# Patient Record
Sex: MALE | Race: WHITE
[De-identification: names, ages, dates, MRNs, and addresses within clinical notes are randomized per-mention and may not be internally consistent; named-entity substitution may affect disease eponyms.]

---

## 2017-03-24 ENCOUNTER — HOSPITAL ENCOUNTER (OUTPATIENT)
Dept: HOSPITAL 53 - M LABDRAW1 | Age: 62
End: 2017-03-24
Attending: FAMILY MEDICINE
Payer: COMMERCIAL

## 2017-03-24 DIAGNOSIS — R73.01: Primary | ICD-10-CM

## 2017-03-24 DIAGNOSIS — E78.2: ICD-10-CM

## 2017-03-24 LAB
ALBUMIN SERPL BCG-MCNC: 4 GM/DL (ref 3.2–5.2)
ALBUMIN/GLOB SERPL: 1.21 {RATIO} (ref 1–1.93)
ALP SERPL-CCNC: 75 U/L (ref 45–117)
ALT SERPL W P-5'-P-CCNC: 46 U/L (ref 12–78)
ANION GAP SERPL CALC-SCNC: 9 MEQ/L (ref 8–16)
AST SERPL-CCNC: 32 U/L (ref 15–37)
BILIRUB SERPL-MCNC: 1 MG/DL (ref 0.2–1)
BUN SERPL-MCNC: 19 MG/DL (ref 7–18)
CALCIUM SERPL-MCNC: 8.6 MG/DL (ref 8.8–10.2)
CHLORIDE SERPL-SCNC: 106 MEQ/L (ref 98–107)
CHOLEST SERPL-MCNC: 175 MG/DL (ref ?–200)
CO2 SERPL-SCNC: 27 MEQ/L (ref 21–32)
CREAT SERPL-MCNC: 0.92 MG/DL (ref 0.7–1.3)
EST. AVERAGE GLUCOSE BLD GHB EST-MCNC: 117 MG/DL (ref 60–110)
GFR SERPL CREATININE-BSD FRML MDRD: > 60 ML/MIN/{1.73_M2} (ref 49–?)
GLUCOSE SERPL-MCNC: 111 MG/DL (ref 80–110)
POTASSIUM SERPL-SCNC: 3.6 MEQ/L (ref 3.5–5.1)
PROT SERPL-MCNC: 7.3 GM/DL (ref 6.4–8.2)
SODIUM SERPL-SCNC: 142 MEQ/L (ref 136–145)
TRIGL SERPL-MCNC: 90 MG/DL (ref ?–150)

## 2017-07-07 ENCOUNTER — HOSPITAL ENCOUNTER (OUTPATIENT)
Dept: HOSPITAL 53 - M ED | Age: 62
Setting detail: OBSERVATION
LOS: 1 days | Discharge: HOME | End: 2017-07-08
Attending: FAMILY MEDICINE | Admitting: HOSPITALIST
Payer: COMMERCIAL

## 2017-07-07 VITALS — BODY MASS INDEX: 28.47 KG/M2 | HEIGHT: 68 IN | WEIGHT: 187.83 LBS

## 2017-07-07 VITALS — DIASTOLIC BLOOD PRESSURE: 81 MMHG | SYSTOLIC BLOOD PRESSURE: 161 MMHG

## 2017-07-07 DIAGNOSIS — E78.5: ICD-10-CM
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
## 2017-07-07 DIAGNOSIS — S32.029A: ICD-10-CM
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
## 2017-07-07 DIAGNOSIS — S32.018A: Primary | ICD-10-CM

## 2017-07-07 DIAGNOSIS — I10: ICD-10-CM
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
## 2017-07-07 DIAGNOSIS — E55.9: ICD-10-CM

## 2017-07-07 DIAGNOSIS — R73.03: ICD-10-CM
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
## 2017-07-07 DIAGNOSIS — Z79.899: ICD-10-CM

## 2017-07-07 DIAGNOSIS — R93.7: ICD-10-CM

## 2017-07-07 DIAGNOSIS — S32.039A: ICD-10-CM

## 2017-07-07 LAB
ANION GAP SERPL CALC-SCNC: 12 MEQ/L (ref 8–16)
BASOPHILS # BLD AUTO: 0 K/MM3 (ref 0–0.2)
BASOPHILS NFR BLD AUTO: 0.6 % (ref 0–1)
BUN SERPL-MCNC: 20 MG/DL (ref 7–18)
CALCIUM SERPL-MCNC: 9.2 MG/DL (ref 8.8–10.2)
CHLORIDE SERPL-SCNC: 107 MEQ/L (ref 98–107)
CO2 SERPL-SCNC: 23 MEQ/L (ref 21–32)
CREAT SERPL-MCNC: 1.03 MG/DL (ref 0.7–1.3)
EOSINOPHIL # BLD AUTO: 0.1 K/MM3 (ref 0–0.5)
EOSINOPHIL NFR BLD AUTO: 0.9 % (ref 0–3)
ERYTHROCYTE [DISTWIDTH] IN BLOOD BY AUTOMATED COUNT: 12.6 % (ref 11.5–14.5)
GFR SERPL CREATININE-BSD FRML MDRD: > 60 ML/MIN/{1.73_M2} (ref 49–?)
GLUCOSE SERPL-MCNC: 141 MG/DL (ref 80–110)
INR PPP: 1.11
LARGE UNSTAINED CELL #: 0.1 K/MM3 (ref 0–0.4)
LARGE UNSTAINED CELL %: 1.2 % (ref 0–4)
LYMPHOCYTES # BLD AUTO: 1.4 K/MM3 (ref 1.5–4.5)
LYMPHOCYTES NFR BLD AUTO: 14.4 % (ref 24–44)
MCH RBC QN AUTO: 31.2 PG (ref 27–33)
MCHC RBC AUTO-ENTMCNC: 35.5 G/DL (ref 32–36.5)
MCV RBC AUTO: 87.9 FL (ref 80–96)
MONOCYTES # BLD AUTO: 0.6 K/MM3 (ref 0–0.8)
MONOCYTES NFR BLD AUTO: 5.9 % (ref 0–5)
NEUTROPHILS # BLD AUTO: 7.4 K/MM3 (ref 1.8–7.7)
NEUTROPHILS NFR BLD AUTO: 77 % (ref 36–66)
PLATELET # BLD AUTO: 198 K/MM3 (ref 150–450)
POTASSIUM SERPL-SCNC: 3.6 MEQ/L (ref 3.5–5.1)
SODIUM SERPL-SCNC: 142 MEQ/L (ref 136–145)
WBC # BLD AUTO: 9.6 K/MM3 (ref 4–10)

## 2017-07-07 PROCEDURE — 93005 ELECTROCARDIOGRAM TRACING: CPT

## 2017-07-07 PROCEDURE — 85610 PROTHROMBIN TIME: CPT

## 2017-07-07 PROCEDURE — 86900 BLOOD TYPING SEROLOGIC ABO: CPT

## 2017-07-07 PROCEDURE — 71260 CT THORAX DX C+: CPT

## 2017-07-07 PROCEDURE — 85730 THROMBOPLASTIN TIME PARTIAL: CPT

## 2017-07-07 PROCEDURE — 85025 COMPLETE CBC W/AUTO DIFF WBC: CPT

## 2017-07-07 PROCEDURE — 96361 HYDRATE IV INFUSION ADD-ON: CPT

## 2017-07-07 PROCEDURE — 99284 EMERGENCY DEPT VISIT MOD MDM: CPT

## 2017-07-07 PROCEDURE — 96374 THER/PROPH/DIAG INJ IV PUSH: CPT

## 2017-07-07 PROCEDURE — 85027 COMPLETE CBC AUTOMATED: CPT

## 2017-07-07 PROCEDURE — 86901 BLOOD TYPING SEROLOGIC RH(D): CPT

## 2017-07-07 PROCEDURE — 74177 CT ABD & PELVIS W/CONTRAST: CPT

## 2017-07-07 PROCEDURE — 80048 BASIC METABOLIC PNL TOTAL CA: CPT

## 2017-07-07 PROCEDURE — 97162 PT EVAL MOD COMPLEX 30 MIN: CPT

## 2017-07-07 PROCEDURE — 81001 URINALYSIS AUTO W/SCOPE: CPT

## 2017-07-07 PROCEDURE — 96375 TX/PRO/DX INJ NEW DRUG ADDON: CPT

## 2017-07-07 PROCEDURE — 96376 TX/PRO/DX INJ SAME DRUG ADON: CPT

## 2017-07-07 PROCEDURE — 86850 RBC ANTIBODY SCREEN: CPT

## 2017-07-07 PROCEDURE — 96372 THER/PROPH/DIAG INJ SC/IM: CPT

## 2017-07-07 PROCEDURE — 36415 COLL VENOUS BLD VENIPUNCTURE: CPT

## 2017-07-07 RX ADMIN — MORPHINE SULFATE PRN MG: 4 INJECTION INTRAVENOUS at 14:23

## 2017-07-07 RX ADMIN — DOCUSATE SODIUM,SENNOSIDES SCH TAB: 50; 8.6 TABLET, FILM COATED ORAL at 21:01

## 2017-07-07 RX ADMIN — CYCLOBENZAPRINE HYDROCHLORIDE PRN MG: 5 TABLET, FILM COATED ORAL at 21:01

## 2017-07-07 RX ADMIN — MORPHINE SULFATE PRN MG: 4 INJECTION INTRAVENOUS at 13:35

## 2017-07-07 NOTE — REP
Clinical:  Trauma.

 

Technique:  Axial contrast enhanced images from the thoracic inlet to the upper

abdomen using 100 ml Isovue 370 intravenous contrast material with coronal and

sagittal re-formations.

 

Comparison:  03/08/2007.

 

Findings:

Lung fields demonstrate moderate chronic bibasilar changes along with mild

bronchiectasis and minimal scattered areas of pleural thickening which remain

relatively stable compared to 2007.  A 3 mm soft tissue nodule in the subpleural

right lower lobe (image 59) is unchanged.  No acute consolidation/contusion,

pleural effusion or pneumothorax. No significant nodule or mass lesion.

Mediastinum is without evidence for trauma/injury and mild prominence to right

hilar lymph nodes along with few scattered mediastinal lymph nodes remain stable

compared to 2007.  Musculoskeletal structures demonstrate age-related

degenerative changes, and a very subtle nondisplaced posterior left ninth rib

fracture cannot be excluded.

 

Impression:

Chronic stable changes as described above.

No acute mediastinal or pleuroparenchymal process.

Cannot exclude extremely subtle nondisplaced posterior left ninth rib fracture.

 

 

 

Signed by

Robert Adame MD 07/07/2017 02:57 P

## 2017-07-07 NOTE — HPEPDOC
Medical History and Physical


Date of Admission


07/07/2017





History and Physical


Primary care provider: Dr. Ware





Attending: Dr. Alexia Pratt





CHIEF COMPLAINT: 


Intractable back pain status post fall from a height of approximately 5 feet





HISTORY OF PRESENT ILLNESS:


Mr. Parikh is a 61-year-old  male who was standing on some 

scaffolding that was approximately 5 feet off the ground while painting his 

house.  This occurred earlier today.  He believes that one of the boards may 

have broken or flipped and he fell to the ground landing on his back on a 2 x 4 

that was laying on the concrete.  He did not lose consciousness, and does not 

believe that he struck his head. His wife did witness the fall as well and she 

is accompanying him in the emergency department here today.  Since the fall he 

has had intractable back pain, mostly on the left side.  He is essentially 

unable to move around secondary to pain, he is being given 12 mg of morphine in 

the emergency department with little to no improvement.  CT of the chest with 

contrast cannot exclude a subtle nondisplaced posterior left ninth rib fracture

, and a CT of the abdomen and pelvis reveals subtle nondisplaced left 

transverse process fractures of L1, L2, and L3. Orthopedic surgery was called 

by the ED provider who recommended that he be admitted to the hospitalist for 

intractable pain control, but he agreed to be consulted.  





ALLERGIES: 


No known drug allergies





PAST MEDICAL HISTORY:


Hypertension


Prediabetes being controlled with diet


Hyperlipidemia being controlled with diet





PAST SURGICAL HISTORY:


Right inguinal hernia repair approximately one year ago


Jaw fracture approximately 40 years ago





SOCIAL HISTORY:


He lives at home with his wife. He is currently retired. He denies any tobacco 

use, he does have an occasional glass of wine, and denies illicit drug use.





FAMILY HISTORY:


He states that diabetes is prevalent in his family, both his parents, many of 

his siblings, and some of his aunts and uncles all have diabetes. A few of them 

also have heart disease as well. He does have an uncle with colon cancer





REVIEW OF SYSTEMS:


Constitutional: Patient denies fevers, chills, night sweats, recent weight gain/

loss.


HEENT: Patient denies blurred or double vision, transient visual disturbances, 

postnasal drip, epistaxis, sore throat, difficulty chewing or swallowing food.


Cardiovascular: Patient denies palpitations, exertional dyspnea, orthopnea, 

edema of the extremities, claudication.


Respiratory: Patient denies dyspnea, wheezing, cough, hemoptysis, sputum 

production.


Gastrointestinal: Patient denies nausea, vomiting, diarrhea, constipation, 

melena, hematochezia, hematemesis, jaundice.


Musculoskeletal: He is in a significant amount of pain, the worst part is his 

left back. He also states that he has spasms wrapping around both sides of the 

lower rib cage. The pain is described as severe, spasmodic, unrelenting. There 

is no radiation. Onset was a few hours ago after his fall.





PHYSICAL EXAMINATION:


Vitals: Temperature 95.1, pulse 84 regular, respiratory rate 18, blood pressure 

163/92, pulse oximetry is 92% on room air





General: Awake, alert, oriented 3. He does appear to be in a significant 

amount of pain. He is barely able to roll over for the examination. Otherwise, 

he remains essentially motionless in the ED stretcher.


HEENT: Head normocephalic atraumatic, conjunctiva are pink, sclera are 

nonicteric, buccal mucosa is pink and moist with no lesions in the oropharynx.  

He is missing multiple teeth. Hearing is grossly intact to conversation.


Respiratory: Clear to auscultation bilaterally with no wheezes, rales, or 

rhonchi.


Cardiovascular: Regular rate and rhythm, with no rubs, gallops, or murmur.


Abdomen: Soft, tender in the midepigastric area and extending out along the 

bilateral infracostal areas, nondistended, no hepatosplenomegaly appreciated. 

Bowel sounds present. 


Extremities: 2+ pulses in the radial and dorsalis pedis bilaterally. No 

evidence of clubbing or cyanosis.


Integument: He does have just a minimal amount of ecchymoses over the the mid 

lower back


Musculoskeletal: He does have multiple muscle spasms in his paraspinal 

musculature, as well as in the intercostal musculature of the lower ribs, as 

well as the upper abdomen.





ASSESSMENT:


1. Intractable back pain status post fall


2. Hypertension


3. Vitamin D deficiency


4. DVT prophylaxis with Lovenox





PLAN:


Will admit him to [facility] for O2 monitoring as he is essentially a narcotic na

ve patient.  Pain control will be achieved with Percocets, as needed IV morphine

, and cyclobenzaprine for muscle spasms.  He will have Zofran IV available as 

needed for nausea. Will recommend physical therapy evaluate and treat him as 

necessary. Dr. Ray of orthopedic surgery has been contacted by the ED 

physician and consulted.  We will continue his lisinopril for hypertension, his 

blood pressure was elevated which is most likely due to his pain. We'll 

continue his vitamin D supplementation for vitamin D deficiency. The patient 

does report that he suffered from constipation with the use of opiates in the 

past, therefore we will preemptively start him on Senokot-S.  Hopefully we can 

assist him in gettting through this acute phase of intractable pain in a 

monitored setting until such time that he is able to adequately function to 

take care of himself at home.  1 L of normal saline was given to the patient in 

the ED for an elevated BUN to creatinine ratio.  A CBC was ordered daily for 3 

days as the patient is on Lovenox for DVT prophylaxis. No additional labs will 

be ordered as I feel that this will be a waste of resources, and I do not 

anticipate that he would have any new electrolyte imbalances, or kidney issues. 

Further diagnostics may be warranted if the patient begins to exhibit any new 

symptoms.





My preceptor for this patient encounter was physically present in the building 

during the encounter and was fully available. As needed, all aspects of the 

patient interview, examination, medical decision making process, and medical 

care plan development were reviewed and approved by the preceptor. Preceptor is 

aware and concurs with the plan as stated in the body of this note and will 

attest to such by his/her cosignature.





Vital Signs





Vital Signs








  Date Time  Temp Pulse Resp B/P (MAP) Pulse Ox O2 Delivery O2 Flow Rate FiO2


 


7/7/17 15:05   18     


 


7/7/17 13:09        


 


7/7/17 12:20 95.1 84   92 Room Air  











Laboratory Data


Labs 24[facility]


Laboratory Tests 2


7/7/17 13:28: 


White Blood Count 9.6, Red Blood Count 5.00, Hemoglobin 15.6, Hematocrit 43.9, 

Mean Corpuscular Volume 87.9, Mean Corpuscular Hemoglobin 31.2, Mean 

Corpuscular Hemoglobin Concent 35.5, Red Cell Distribution Width 12.6, Platelet 

Count 198, Neutrophils (%) (Auto) 77.0H, Lymphocytes (%) (Auto) 14.4L, 

Monocytes (%) (Auto) 5.9H, Eosinophils (%) (Auto) 0.9, Basophils (%) (Auto) 0.6

, Neutrophils # (Auto) 7.4, Lymphocytes # (Auto) 1.4L, Monocytes # (Auto) 0.6, 

Eosinophils # (Auto) 0.1, Basophils # (Auto) 0.0, Large Unclassified Cells % 1.2

, Large Unclassified Cells # 0.1, Prothrombin Time 14.5, Prothromb Time 

International Ratio 1.11, Activated Partial Thromboplast Time 26.9, Anion Gap 12

, Glomerular Filtration Rate > 60.0, Blood Urea Nitrogen 20H, Creatinine 1.03, 

Sodium Level 142, Potassium Level 3.6, Chloride Level 107, Carbon Dioxide Level 

23, Calcium Level 9.2


7/7/17 16:26: 


Urine Appearance CLEAR, Urine Color YELLOW, Urine pH 6.0, Urine Specific 

Gravity 1.025, Urine Protein NEGATIVE, Urine Glucose (UA) NEGATIVE, Urine 

Ketones NEGATIVE, Urine Urobilinogen 0.2, Urine Bilirubin NEGATIVE, Urine 

Leukocyte Esterase NEGATIVE, Urine Blood NEGATIVE, Urine Nitrite NEGATIVE, 

Urine WBC (Auto) 1, Urine RBC (Auto) 1, Urine Hyaline Casts (Auto) 0, Urine 

Bacteria (Auto) NEGATIVE, Urine Squamous Epithelial Cells 0, Urine Sperm (Auto)


CBC/BMP


Laboratory Tests


7/7/17 13:28








Red Blood Count 5.00, Mean Corpuscular Volume 87.9, Mean Corpuscular Hemoglobin 

31.2, Mean Corpuscular Hemoglobin Concent 35.5, Red Cell Distribution Width 12.6

, Neutrophils (%) (Auto) 77.0 H, Lymphocytes (%) (Auto) 14.4 L, Monocytes (%) (

Auto) 5.9 H, Eosinophils (%) (Auto) 0.9, Basophils (%) (Auto) 0.6, Neutrophils 

# (Auto) 7.4, Lymphocytes # (Auto) 1.4 L, Monocytes # (Auto) 0.6, Eosinophils # 

(Auto) 0.1, Basophils # (Auto) 0.0, Calcium Level 9.2





Home Medications


Scheduled


Lisinopril (Lisinopril) 10 Mg Tab, 10 MG PO DAILY


Vitamin D (Vitamin D) 2,000 Unit Cap, 4,000 MG PO DAILY





Scheduled PRN


 (Probiotic) 1 Cap Cap, 1 CAP PO DAILY PRN for STOMACH UPSET





Allergies


Coded Allergies:  


     No Known Drug Allergy (Unverified  Allergy, Unknown, 7/7/17)











FRANCISCO JAVIER HARRELL DO Jul 7, 2017 18:57

## 2017-07-07 NOTE — REP
Clinical:  Trauma.

 

Technique:  Axial contrast enhanced images from the lung bases to the pubic

symphysis using 100 ml Isovue 370 intravenous contrast material with coronal and

sagittal re-formations.

 

Findings:

Lung bases demonstrate chronic interstitial changes with mild bronchiectasis and

suspected right hilar adenopathy.  3 mm noncalcified nodule in the periphery

right lower lobe (image 10). These findings are relatively stable when compared

to chest CT dated 2007.  Visualized heart and pericardium appear normal.

 

No evidence for solid organ injury.  Liver, spleen, pancreas, gallbladder,

bilateral adrenal glands and kidneys are normal.  The enteric system is without

obstruction or acute inflammatory process.  Few scattered sigmoid diverticula

noted without acute diverticulitis.  Pelvis demonstrates normal bladder and

coarse calcifications with otherwise normal prostate gland.  No ascites.  No free

air.  No adenopathy.  Vasculature appears normal for age.  Musculoskeletal

structures demonstrate a nondisplaced fracture of the L1 and possibly L2 and L3

left transverse processes which should be correlated with physical examination

and point of tenderness.  The surrounding paraspinal musculature as well as

remainder of the musculoskeletal structures appear intact.

 

Impression:

1.  Very subtle nondisplaced left L1 transverse process fracture and possible

more subtle left transverse process fractures of L2 and L3.  No other evidence

for abdominopelvic trauma/injury.

2.  No evidence for solid organ injury.

3.  Scattered sigmoid diverticula without acute diverticulitis.

 

 

 

Signed by

Robert Adame MD 07/07/2017 02:58 P

## 2017-07-08 VITALS — SYSTOLIC BLOOD PRESSURE: 162 MMHG | DIASTOLIC BLOOD PRESSURE: 84 MMHG

## 2017-07-08 VITALS — DIASTOLIC BLOOD PRESSURE: 84 MMHG | SYSTOLIC BLOOD PRESSURE: 162 MMHG

## 2017-07-08 LAB
ERYTHROCYTE [DISTWIDTH] IN BLOOD BY AUTOMATED COUNT: 13.1 % (ref 11.5–14.5)
MCH RBC QN AUTO: 31.2 PG (ref 27–33)
MCHC RBC AUTO-ENTMCNC: 35.2 G/DL (ref 32–36.5)
MCV RBC AUTO: 88.7 FL (ref 80–96)
PLATELET # BLD AUTO: 164 K/MM3 (ref 150–450)
WBC # BLD AUTO: 10.2 K/MM3 (ref 4–10)

## 2017-07-08 RX ADMIN — CYCLOBENZAPRINE HYDROCHLORIDE PRN MG: 5 TABLET, FILM COATED ORAL at 05:24

## 2017-07-08 RX ADMIN — DOCUSATE SODIUM,SENNOSIDES SCH TAB: 50; 8.6 TABLET, FILM COATED ORAL at 09:21

## 2017-07-08 NOTE — ECGEPIP
Stationary ECG Study

                              [facility]

                                       

                                       Test Date:    2017

Pat Name:     TACHO CASSIDY         Department:   

Patient ID:   [id number]                 Room:         -79

Gender:       M                        Technician:   

:          [id number]               Requested By: RIKA PORTILLO 

Order Number: [id number]     Reading MD:   Jeannine Flynn

                                 Measurements

Intervals                              Axis          

Rate:         79                       P:            40

NV:           191                      QRS:          2

QRSD:         93                       T:            15

QT:           395                                    

QTc:          454                                    

                           Interpretive Statements

SINUS RHYTHM

BORDERLINE Left ventricular hypertrophy

 NO PRIOR

Electronically Signed On 2017 6:51:18 EDT by Jeannine Flynn

## 2017-07-09 NOTE — DSES
DATE OF ADMISSION:  07/07/2017

DATE OF DISCHARGE:  07/08/2017

 

REASON FOR ADMISSION:  Mr. Parikh was admitted to hospital after presentation

to emergency department (ED) following a fall from a ladder approximately 5 feet,

fell onto a hard object on the ground, specifically a 2 x 4.  He sustained injury

to the transverse process of L1 and L2 and L3; and also, subtle nondisplaced

posterior 9th rib fracture "cannot be excluded."  No loss of consciousness.  No

head injury.  No evidence of internal bleeding.  Hemoglobin was 15.6 on

admission, is 13.9 on discharge.  Was receiving intravenous (IV) fluids, however,

metabolic profile was unremarkable except for fasting glucose of 141 on

07/07/2017, "fasting glucose."   Likely, it was not fasting.  Urinalysis was

negative, so no evidence of renal injury.  The patient was given narcotic pain

medications.  Admitted to hospital.  Today, he was able to ambulate and was seen

and tolerating pain adequately using Roxicet.  He was seen in consultation by Dr. Ray, who affirmed a 7-pound weightlifting limit.  Otherwise, activity as

tolerated.

 

DISCHARGE DIAGNOSES:

1.  Transverse process fracture involving left L1, L2, and L3, probable left 9th

rib fracture, as well.

2.  Pain associated with injury.

3.  Fall from height.

 

CHRONIC DIAGNOSIS:  Hypertension.

 

PLAN:  The patient will be discharged on ibuprofen 600 mg by mouth three times a

day with a recommendation for omeprazole 20 mg a day to reduce risk for

gastrointestinal (GI) bleeding.  He will also have a prescription for Percocet

1-2 four times a day as needed pain, maximum daily dose of six, and 30 were

dispensed.  He will followup with Dr. Ware in approximately 2 weeks.  He is

requested to monitor his blood pressure at home, to monitor for possibility of

blood pressure increased triggered by use of ibuprofen, and contact Dr. Ware if blood pressure does seem to rise for alternative suggestions for

pain control.  Activity will be as tolerated with a 7-pound weightlifting limit

as imposed by Dr. Ray.  Also, recommend no operating of motor vehicles due to

narcotic effect.  Constipation prophylaxis will be provided with Senokot-S one by

mouth twice a day.  The dose may be titrated as required, and other measures can

be introduced if this is not adequate.  Diet as tolerated.  Suggest a

2-gram-sodium limitation due to history of hypertension.

## 2017-09-08 ENCOUNTER — HOSPITAL ENCOUNTER (OUTPATIENT)
Dept: HOSPITAL 53 - M LABDRAW1 | Age: 62
End: 2017-09-08
Attending: FAMILY MEDICINE
Payer: COMMERCIAL

## 2017-09-08 DIAGNOSIS — E78.2: Primary | ICD-10-CM

## 2017-09-08 LAB
CHOLEST SERPL-MCNC: 163 MG/DL (ref ?–200)
TRIGL SERPL-MCNC: 110 MG/DL (ref ?–150)

## 2017-09-28 ENCOUNTER — HOSPITAL ENCOUNTER (OUTPATIENT)
Dept: HOSPITAL 53 - M LABDRAW1 | Age: 62
End: 2017-09-28
Attending: PHYSICIAN ASSISTANT
Payer: COMMERCIAL
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
## 2017-09-28 DIAGNOSIS — S32.038D: Primary | ICD-10-CM
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
## 2018-03-09 ENCOUNTER — HOSPITAL ENCOUNTER (OUTPATIENT)
Dept: HOSPITAL 53 - M LABDRAW1 | Age: 63
End: 2018-03-09
Attending: FAMILY MEDICINE
Payer: COMMERCIAL

## 2018-03-09 DIAGNOSIS — Z12.5: ICD-10-CM

## 2018-03-09 DIAGNOSIS — R73.01: ICD-10-CM

## 2018-03-09 DIAGNOSIS — E78.2: Primary | ICD-10-CM

## 2018-03-09 LAB
ALBUMIN/GLOBULIN RATIO: 1.08 (ref 1–1.93)
ALBUMIN: 4 GM/DL (ref 3.2–5.2)
ALKALINE PHOSPHATASE: 76 U/L (ref 45–117)
ALT SERPL W P-5'-P-CCNC: 50 U/L (ref 12–78)
ANION GAP: 8 MEQ/L (ref 8–16)
AST SERPL-CCNC: 32 U/L (ref 7–37)
BILIRUBIN,TOTAL: 0.7 MG/DL (ref 0.2–1)
BLOOD UREA NITROGEN: 16 MG/DL (ref 7–18)
CALCIUM LEVEL: 8.7 MG/DL (ref 8.8–10.2)
CARBON DIOXIDE LEVEL: 28 MEQ/L (ref 21–32)
CHLORIDE LEVEL: 108 MEQ/L (ref 98–107)
CHOLEST SERPL-MCNC: 160 MG/DL (ref ?–200)
CHOLESTEROL RISK RATIO: 5 (ref ?–5)
CREATININE FOR GFR: 1.04 MG/DL (ref 0.7–1.3)
EST. AVERAGE GLUCOSE BLD GHB EST-MCNC: 123 MG/DL (ref 60–110)
GFR SERPL CREATININE-BSD FRML MDRD: > 60 ML/MIN/{1.73_M2} (ref 49–?)
GLUCOSE, FASTING: 106 MG/DL (ref 70–100)
HDLC SERPL-MCNC: 32 MG/DL (ref 40–?)
LDL CHOLESTEROL: 100.4 MG/DL (ref ?–100)
NONHDLC SERPL-MCNC: 128 MG/DL
POTASSIUM SERUM: 4 MEQ/L (ref 3.5–5.1)
PSA SERPL-MCNC: 1.07 NG/ML (ref ?–4)
SODIUM LEVEL: 144 MEQ/L (ref 136–145)
TOTAL PROTEIN: 7.7 GM/DL (ref 6.4–8.2)
TRIGLYCERIDES LEVEL: 138 MG/DL (ref ?–150)

## 2019-03-20 ENCOUNTER — HOSPITAL ENCOUNTER (OUTPATIENT)
Dept: HOSPITAL 53 - M LABDRAW1 | Age: 64
End: 2019-03-20
Attending: FAMILY MEDICINE
Payer: COMMERCIAL

## 2019-03-20 DIAGNOSIS — Z12.5: ICD-10-CM

## 2019-03-20 DIAGNOSIS — R73.01: ICD-10-CM

## 2019-03-20 DIAGNOSIS — I10: Primary | ICD-10-CM

## 2019-03-20 LAB
ALBUMIN SERPL BCG-MCNC: 4 GM/DL (ref 3.2–5.2)
ALT SERPL W P-5'-P-CCNC: 54 U/L (ref 12–78)
BILIRUB SERPL-MCNC: 1.6 MG/DL (ref 0.2–1)
BUN SERPL-MCNC: 17 MG/DL (ref 7–18)
CALCIUM SERPL-MCNC: 8.8 MG/DL (ref 8.8–10.2)
CHLORIDE SERPL-SCNC: 104 MEQ/L (ref 98–107)
CHOLEST SERPL-MCNC: 179 MG/DL (ref ?–200)
CHOLEST/HDLC SERPL: 5.26 {RATIO} (ref ?–5)
CO2 SERPL-SCNC: 27 MEQ/L (ref 21–32)
CREAT SERPL-MCNC: 1.09 MG/DL (ref 0.7–1.3)
EST. AVERAGE GLUCOSE BLD GHB EST-MCNC: 134 MG/DL (ref 60–110)
GFR SERPL CREATININE-BSD FRML MDRD: > 60 ML/MIN/{1.73_M2} (ref 49–?)
GLUCOSE SERPL-MCNC: 145 MG/DL (ref 70–100)
HDLC SERPL-MCNC: 34 MG/DL (ref 40–?)
LDLC SERPL CALC-MCNC: 120 MG/DL (ref ?–100)
NONHDLC SERPL-MCNC: 145 MG/DL
POTASSIUM SERPL-SCNC: 3.6 MEQ/L (ref 3.5–5.1)
PROT SERPL-MCNC: 7.7 GM/DL (ref 6.4–8.2)
SODIUM SERPL-SCNC: 138 MEQ/L (ref 136–145)
TRIGL SERPL-MCNC: 126 MG/DL (ref ?–150)

## 2019-08-01 ENCOUNTER — HOSPITAL ENCOUNTER (OUTPATIENT)
Dept: HOSPITAL 53 - M LABDRAW1 | Age: 64
End: 2019-08-01
Attending: FAMILY MEDICINE
Payer: COMMERCIAL

## 2019-08-01 DIAGNOSIS — R73.01: Primary | ICD-10-CM

## 2019-08-01 DIAGNOSIS — E78.2: ICD-10-CM

## 2019-08-01 LAB
BUN SERPL-MCNC: 22 MG/DL (ref 7–18)
CALCIUM SERPL-MCNC: 8.7 MG/DL (ref 8.8–10.2)
CHLORIDE SERPL-SCNC: 107 MEQ/L (ref 98–107)
CHOLEST SERPL-MCNC: 164 MG/DL (ref ?–200)
CHOLEST/HDLC SERPL: 4.55 {RATIO} (ref ?–5)
CO2 SERPL-SCNC: 28 MEQ/L (ref 21–32)
CREAT SERPL-MCNC: 1.13 MG/DL (ref 0.7–1.3)
EST. AVERAGE GLUCOSE BLD GHB EST-MCNC: 131 MG/DL (ref 60–110)
GFR SERPL CREATININE-BSD FRML MDRD: > 60 ML/MIN/{1.73_M2} (ref 49–?)
GLUCOSE SERPL-MCNC: 107 MG/DL (ref 70–100)
HDLC SERPL-MCNC: 36 MG/DL (ref 40–?)
LDLC SERPL CALC-MCNC: 110 MG/DL (ref ?–100)
NONHDLC SERPL-MCNC: 128 MG/DL
POTASSIUM SERPL-SCNC: 3.7 MEQ/L (ref 3.5–5.1)
SODIUM SERPL-SCNC: 140 MEQ/L (ref 136–145)
TRIGL SERPL-MCNC: 89 MG/DL (ref ?–150)

## 2020-01-21 NOTE — CR
DATE OF CONSULTATION: 7/8/17.

 

REASON FOR CONSULTATION:

I was asked to see the patient to evaluate back discomfort after a fall from

scaffolding.

 

HISTORY:

This 61-year-old gentleman was working on his own house and he fell off the

scaffolding and landed on pavement. He says he fell about five feet. He had

significant pain in the back, bad enough that he came to the emergency room for

further evaluation.

 

His pain there was controlled with narcotic analgesics.  He had a CT scan.  CT

scan reflected nondisplaced left L1 transverse process fracture and I agree with

the interpretation that there are likely transverse processes fractures of L2 
and

L3 which are harder to see.

 

The patient was admitted for an observation by the hospitalist.  Also of note, 
he

had a CT scan of the chest that suggested the possibility of the left ninth rib

fracture.

 

ALLERGIES:  The patient has no known allergies.

 

MEDICAL HISTORY: Includes hypertension.

 

SURGICAL HISTORY:

Inguinal hernia repair on the right and jaw fracture many years ago.

 

SOCIAL HISTORY:

He is , lives with his spouse.  Retired.  Does not smoke, occasionally

drinks.

 

FAMILY HISTORY:  Not contributory.

 

REVIEW OF SYSTEMS:

He is complaining of discomfort in the left side of his ribcage and discomfort 
in

his back. He is not complaining of fevers or chills.  He is not complaining of

shortness of breath but it does hurt a bit to take a deep breath.  He reports no

heart trouble or cardiac disability. No peripheral edema. He reports no stomach

trouble.  He reports no weakness or numbness or tingling.

 

CLINICAL EXAMINATION:

He is alert, oriented and cooperative.  Mood and affect appropriate. I met the

patient as he was ambulating in the hallway with nursing staff.  He was

appreciated to have  significant pain to the left side of his lumbar spine but

able to stand up straight.  He also had pain to the left side of the rib cage

with palpation of this area, seemed to be consistent with around the ninth rib.

There is no abdominal distension.  He is not short of breath.  The lower

extremities were neurologically intact without any deficit.  No peripheral 
edema.

 

 

IMPRESSION:

Multiple transverse process fractures after a fall, likely ninth rib fracture.

 

RECOMMENDATIONS:

I talked to the patient about followup. I would like to see the patient in the

next 7-10 days and reevaluate him. We would check repeat x-rays to make sure

there is been no appreciation of interval compression deformity, although one 
was

not appreciated on the CT scan.

 

These fractures will not require bracing for stability, but may benefit from

bracing for comfort depending on how he is doing when he follows up. He should

use his narcotic analgesics sparingly.  This was all discussed with the patient

who is comfortable with that plan. For further details, please refer to medical

record.

 

Approximately 30 minutes were invested in this case including more than 50%

face-to-face time.

[facility] Labs from 12/2/19 are in there but not the ones from 12/30/19.

## 2020-03-18 ENCOUNTER — HOSPITAL ENCOUNTER (OUTPATIENT)
Dept: HOSPITAL 53 - M SFHCADAM | Age: 65
End: 2020-03-18
Attending: FAMILY MEDICINE
Payer: COMMERCIAL

## 2020-03-18 DIAGNOSIS — R73.01: ICD-10-CM

## 2020-03-18 DIAGNOSIS — E78.2: Primary | ICD-10-CM

## 2020-03-18 DIAGNOSIS — Z12.5: ICD-10-CM

## 2020-03-18 LAB
ALBUMIN SERPL BCG-MCNC: 4 GM/DL (ref 3.2–5.2)
ALT SERPL W P-5'-P-CCNC: 58 U/L (ref 12–78)
BILIRUB SERPL-MCNC: 1 MG/DL (ref 0.2–1)
BUN SERPL-MCNC: 21 MG/DL (ref 7–18)
CALCIUM SERPL-MCNC: 9.5 MG/DL (ref 8.8–10.2)
CHLORIDE SERPL-SCNC: 106 MEQ/L (ref 98–107)
CHOLEST SERPL-MCNC: 192 MG/DL (ref ?–200)
CHOLEST/HDLC SERPL: 6.19 {RATIO} (ref ?–5)
CO2 SERPL-SCNC: 28 MEQ/L (ref 21–32)
CREAT SERPL-MCNC: 1.16 MG/DL (ref 0.7–1.3)
EST. AVERAGE GLUCOSE BLD GHB EST-MCNC: 146 MG/DL (ref 60–110)
GFR SERPL CREATININE-BSD FRML MDRD: > 60 ML/MIN/{1.73_M2} (ref 49–?)
GLUCOSE SERPL-MCNC: 127 MG/DL (ref 70–100)
HDLC SERPL-MCNC: 31 MG/DL (ref 40–?)
LDLC SERPL CALC-MCNC: 129 MG/DL (ref ?–100)
NONHDLC SERPL-MCNC: 161 MG/DL
POTASSIUM SERPL-SCNC: 4 MEQ/L (ref 3.5–5.1)
PROT SERPL-MCNC: 8 GM/DL (ref 6.4–8.2)
SODIUM SERPL-SCNC: 139 MEQ/L (ref 136–145)
TRIGL SERPL-MCNC: 161 MG/DL (ref ?–150)

## 2021-01-11 ENCOUNTER — HOSPITAL ENCOUNTER (OUTPATIENT)
Dept: HOSPITAL 53 - M PLALAB | Age: 66
End: 2021-01-11
Attending: FAMILY MEDICINE
Payer: MEDICARE

## 2021-01-11 DIAGNOSIS — E78.2: ICD-10-CM

## 2021-01-11 DIAGNOSIS — N48.1: Primary | ICD-10-CM

## 2021-01-11 DIAGNOSIS — E11.9: ICD-10-CM

## 2021-01-11 LAB
ALBUMIN SERPL BCG-MCNC: 4 GM/DL (ref 3.2–5.2)
ALT SERPL W P-5'-P-CCNC: 50 U/L (ref 12–78)
BILIRUB SERPL-MCNC: 1.2 MG/DL (ref 0.2–1)
BUN SERPL-MCNC: 16 MG/DL (ref 7–18)
CALCIUM SERPL-MCNC: 9.5 MG/DL (ref 8.8–10.2)
CHLORIDE SERPL-SCNC: 105 MEQ/L (ref 98–107)
CHOLEST SERPL-MCNC: 182 MG/DL (ref ?–200)
CHOLEST/HDLC SERPL: 5.2 {RATIO} (ref ?–5)
CO2 SERPL-SCNC: 28 MEQ/L (ref 21–32)
CREAT SERPL-MCNC: 1.05 MG/DL (ref 0.7–1.3)
EST. AVERAGE GLUCOSE BLD GHB EST-MCNC: 123 MG/DL (ref 60–110)
GFR SERPL CREATININE-BSD FRML MDRD: > 60 ML/MIN/{1.73_M2} (ref 49–?)
GLUCOSE SERPL-MCNC: 114 MG/DL (ref 70–100)
HCT VFR BLD AUTO: 45.5 % (ref 42–52)
HDLC SERPL-MCNC: 35 MG/DL (ref 40–?)
HGB BLD-MCNC: 15.1 G/DL (ref 13.5–17.5)
LDLC SERPL CALC-MCNC: 122 MG/DL (ref ?–100)
MCH RBC QN AUTO: 29.6 PG (ref 27–33)
MCHC RBC AUTO-ENTMCNC: 33.2 G/DL (ref 32–36.5)
MCV RBC AUTO: 89.2 FL (ref 80–96)
NONHDLC SERPL-MCNC: 147 MG/DL
PLATELET # BLD AUTO: 183 10^3/UL (ref 150–450)
POTASSIUM SERPL-SCNC: 3.8 MEQ/L (ref 3.5–5.1)
PROT SERPL-MCNC: 7.7 GM/DL (ref 6.4–8.2)
RBC # BLD AUTO: 5.1 10^6/UL (ref 4.3–6.1)
SODIUM SERPL-SCNC: 138 MEQ/L (ref 136–145)
TRIGL SERPL-MCNC: 125 MG/DL (ref ?–150)
WBC # BLD AUTO: 7.5 10^3/UL (ref 4–10)

## 2021-03-17 ENCOUNTER — HOSPITAL ENCOUNTER (OUTPATIENT)
Dept: HOSPITAL 53 - M LABSMT | Age: 66
End: 2021-03-17
Attending: UROLOGY
Payer: MEDICARE

## 2021-03-17 ENCOUNTER — HOSPITAL ENCOUNTER (OUTPATIENT)
Dept: HOSPITAL 53 - M ADAMS | Age: 66
End: 2021-03-17
Attending: UROLOGY
Payer: MEDICARE

## 2021-03-17 DIAGNOSIS — N47.1: ICD-10-CM

## 2021-03-17 DIAGNOSIS — M51.34: ICD-10-CM

## 2021-03-17 DIAGNOSIS — Z01.818: Primary | ICD-10-CM

## 2021-03-17 LAB
APTT BLD: 30.2 SECONDS (ref 24.2–38.5)
BUN SERPL-MCNC: 17 MG/DL (ref 7–18)
CALCIUM SERPL-MCNC: 9.2 MG/DL (ref 8.8–10.2)
CHLORIDE SERPL-SCNC: 106 MEQ/L (ref 98–107)
CO2 SERPL-SCNC: 28 MEQ/L (ref 21–32)
CREAT SERPL-MCNC: 0.91 MG/DL (ref 0.7–1.3)
GFR SERPL CREATININE-BSD FRML MDRD: > 60 ML/MIN/{1.73_M2} (ref 49–?)
GLUCOSE SERPL-MCNC: 126 MG/DL (ref 70–100)
HCT VFR BLD AUTO: 45.7 % (ref 42–52)
HGB BLD-MCNC: 15.6 G/DL (ref 13.5–17.5)
INR PPP: 1.1
MCH RBC QN AUTO: 30.3 PG (ref 27–33)
MCHC RBC AUTO-ENTMCNC: 34.1 G/DL (ref 32–36.5)
MCV RBC AUTO: 88.7 FL (ref 80–96)
PLATELET # BLD AUTO: 170 10^3/UL (ref 150–450)
POTASSIUM SERPL-SCNC: 4 MEQ/L (ref 3.5–5.1)
PROTHROMBIN TIME: 14.4 SECONDS (ref 12.5–14.3)
RBC # BLD AUTO: 5.15 10^6/UL (ref 4.3–6.1)
SODIUM SERPL-SCNC: 141 MEQ/L (ref 136–145)
WBC # BLD AUTO: 6.1 10^3/UL (ref 4–10)

## 2021-03-17 PROCEDURE — 85610 PROTHROMBIN TIME: CPT

## 2021-03-17 PROCEDURE — 71046 X-RAY EXAM CHEST 2 VIEWS: CPT

## 2021-03-17 PROCEDURE — 93005 ELECTROCARDIOGRAM TRACING: CPT

## 2021-03-17 PROCEDURE — 85027 COMPLETE CBC AUTOMATED: CPT

## 2021-03-17 PROCEDURE — 85730 THROMBOPLASTIN TIME PARTIAL: CPT

## 2021-03-17 PROCEDURE — 80048 BASIC METABOLIC PNL TOTAL CA: CPT

## 2021-03-17 NOTE — REP
INDICATION:

PRE OP.



COMPARISON:

No comparison chest x-ray.  There is a comparison chest CT study from July 7, 2017.



TECHNIQUE:

Two views..



FINDINGS:

The lungs are well inflated and free of infiltrate.  The pleural angles are sharp.

The heart size is normal.  Pulmonary vasculature is not increased.  No significant

bony abnormality is seen.  There is benign pleural thickening bilaterally.  This is

unchanged.  There are degenerative changes in the thoracic spine and aorta.



IMPRESSION:

No active disease..





<Electronically signed by Gavin Ahumada > 03/17/21 [id number]

## 2021-03-20 ENCOUNTER — HOSPITAL ENCOUNTER (OUTPATIENT)
Dept: HOSPITAL 53 - M LABSMTC | Age: 66
End: 2021-03-20
Attending: ANESTHESIOLOGY
Payer: COMMERCIAL

## 2021-03-20 DIAGNOSIS — Z01.812: Primary | ICD-10-CM

## 2021-03-25 ENCOUNTER — HOSPITAL ENCOUNTER (OUTPATIENT)
Dept: HOSPITAL 53 - M SDC | Age: 66
Discharge: HOME | End: 2021-03-25
Attending: UROLOGY
Payer: MEDICARE

## 2021-03-25 VITALS — WEIGHT: 198.8 LBS | HEIGHT: 68 IN | BODY MASS INDEX: 30.13 KG/M2

## 2021-03-25 VITALS — DIASTOLIC BLOOD PRESSURE: 110 MMHG | SYSTOLIC BLOOD PRESSURE: 180 MMHG

## 2021-03-25 VITALS — SYSTOLIC BLOOD PRESSURE: 164 MMHG | DIASTOLIC BLOOD PRESSURE: 93 MMHG

## 2021-03-25 DIAGNOSIS — E11.9: ICD-10-CM

## 2021-03-25 DIAGNOSIS — E88.81: ICD-10-CM

## 2021-03-25 DIAGNOSIS — N52.9: ICD-10-CM

## 2021-03-25 DIAGNOSIS — E78.5: ICD-10-CM

## 2021-03-25 DIAGNOSIS — I10: ICD-10-CM

## 2021-03-25 DIAGNOSIS — G47.33: ICD-10-CM

## 2021-03-25 DIAGNOSIS — Z79.84: ICD-10-CM

## 2021-03-25 DIAGNOSIS — Z79.899: ICD-10-CM

## 2021-03-25 DIAGNOSIS — N40.0: ICD-10-CM

## 2021-03-25 DIAGNOSIS — K21.9: ICD-10-CM

## 2021-03-25 DIAGNOSIS — N47.1: Primary | ICD-10-CM

## 2021-03-25 DIAGNOSIS — Z87.891: ICD-10-CM

## 2021-03-25 PROCEDURE — 88304 TISSUE EXAM BY PATHOLOGIST: CPT

## 2021-03-25 PROCEDURE — 54161 CIRCUM 28 DAYS OR OLDER: CPT

## 2021-03-25 NOTE — ROOPDOC
[facility] Report Of Operation


Report of Operation


DATE OF PROCEDURE: 3/25/21





PREOPERATIVE DIAGNOSIS:  Phimosis. 





POSTOPERATIVE DIAGNOSIS: Phimosis.  





PROCEDURE: Circumcision. 





SURGEON: Bryce Presley MD





ASSISTANT:  None.  





ANESTHESIA: General.  





OPERATIVE INDICATIONS:  This is a 65-year-old male with phimosis and recurrent


episodes of balanitis who was brought to the operating room today for treatment.

 





DESCRIPTION OF PROCEDURE:  The patient was brought to the operating room and


general anesthesia was induced.  Prophylactic antibiotics were infused.  He has


then placed in the supine position and prepped and draped in the usual sterile


fashion.  At this point, circumcising incisions were made at the level of the


coronal sulcus with the foreskin retracted over the glans and then also with


foreskin retracted down off the glans. All the foreskin in between the two


incisions was then removed using electrocautery. Once the foreskin was removed,


any areas of bleeding were controlled with electrocautery.  When satisfied with


hemostasis, the skin of the penile shaft was then 


re-approximated to the glans with interrupted 3-0 chromic sutures.  Once that


was done, dry dressings were applied including a Rosalia and Coban and then this


was my conclusion of the procedure.  The patient was then awakened from


anesthesia and transferred to the recovery room in stable condition. 





Estimated blood loss: 10 ml.





Complications:  None. 





Specimens:  Foreskin.





PLAN:  The patient will keep his dressings on and remove them in two days.  He


will follow up in urology clinic in 2 to 3 weeks for a postoperative visit.











BRYCE PRESLEY MD           Mar 25, 2021 14:41

## 2021-07-14 ENCOUNTER — HOSPITAL ENCOUNTER (OUTPATIENT)
Dept: HOSPITAL 53 - M PLALAB | Age: 66
End: 2021-07-14
Attending: FAMILY MEDICINE
Payer: MEDICARE

## 2021-07-14 DIAGNOSIS — E11.9: Primary | ICD-10-CM

## 2021-07-14 LAB
BUN SERPL-MCNC: 21 MG/DL (ref 7–18)
CALCIUM SERPL-MCNC: 8.8 MG/DL (ref 8.8–10.2)
CHLORIDE SERPL-SCNC: 106 MEQ/L (ref 98–107)
CO2 SERPL-SCNC: 28 MEQ/L (ref 21–32)
CREAT SERPL-MCNC: 0.98 MG/DL (ref 0.7–1.3)
CREAT UR-MCNC: 41.9 MG/DL
EST. AVERAGE GLUCOSE BLD GHB EST-MCNC: 128 MG/DL (ref 60–110)
GFR SERPL CREATININE-BSD FRML MDRD: > 60 ML/MIN/{1.73_M2} (ref 49–?)
GLUCOSE SERPL-MCNC: 129 MG/DL (ref 70–100)
MICROALBUMIN UR-MCNC: 12.3 MG/L
MICROALBUMIN/CREAT UR: 29.3 MCG/MG (ref 0–30)
POTASSIUM SERPL-SCNC: 4 MEQ/L (ref 3.5–5.1)
SODIUM SERPL-SCNC: 139 MEQ/L (ref 136–145)

## 2021-10-19 ENCOUNTER — HOSPITAL ENCOUNTER (OUTPATIENT)
Dept: HOSPITAL 53 - M PLALAB | Age: 66
End: 2021-10-19
Attending: FAMILY MEDICINE
Payer: MEDICARE

## 2021-10-19 DIAGNOSIS — E11.9: Primary | ICD-10-CM

## 2021-10-19 LAB
ALBUMIN SERPL BCG-MCNC: 3.8 GM/DL (ref 3.2–5.2)
ALT SERPL W P-5'-P-CCNC: 34 U/L (ref 12–78)
BILIRUB SERPL-MCNC: 1 MG/DL (ref 0.2–1)
BUN SERPL-MCNC: 21 MG/DL (ref 7–18)
CALCIUM SERPL-MCNC: 8.9 MG/DL (ref 8.8–10.2)
CHLORIDE SERPL-SCNC: 105 MEQ/L (ref 98–107)
CHOLEST SERPL-MCNC: 173 MG/DL (ref ?–200)
CHOLEST/HDLC SERPL: 5.41 {RATIO} (ref ?–5)
CO2 SERPL-SCNC: 31 MEQ/L (ref 21–32)
CREAT SERPL-MCNC: 1.01 MG/DL (ref 0.7–1.3)
EST. AVERAGE GLUCOSE BLD GHB EST-MCNC: 126 MG/DL (ref 60–110)
GFR SERPL CREATININE-BSD FRML MDRD: > 60 ML/MIN/{1.73_M2} (ref 49–?)
GLUCOSE SERPL-MCNC: 119 MG/DL (ref 70–100)
HDLC SERPL-MCNC: 32 MG/DL (ref 40–?)
LDLC SERPL CALC-MCNC: 109 MG/DL (ref ?–100)
NONHDLC SERPL-MCNC: 141 MG/DL
POTASSIUM SERPL-SCNC: 3.8 MEQ/L (ref 3.5–5.1)
PROT SERPL-MCNC: 7.6 GM/DL (ref 6.4–8.2)
SODIUM SERPL-SCNC: 141 MEQ/L (ref 136–145)
TRIGL SERPL-MCNC: 159 MG/DL (ref ?–150)

## 2022-03-22 ENCOUNTER — HOSPITAL ENCOUNTER (OUTPATIENT)
Dept: HOSPITAL 53 - M PLALAB | Age: 67
End: 2022-03-22
Attending: FAMILY MEDICINE
Payer: MEDICARE

## 2022-03-22 DIAGNOSIS — E11.69: Primary | ICD-10-CM

## 2022-03-22 DIAGNOSIS — Z12.5: ICD-10-CM

## 2022-03-22 DIAGNOSIS — N52.1: ICD-10-CM

## 2022-03-22 DIAGNOSIS — I10: ICD-10-CM

## 2022-03-22 DIAGNOSIS — E78.2: ICD-10-CM

## 2022-03-22 LAB
ALBUMIN SERPL BCG-MCNC: 4 GM/DL (ref 3.2–5.2)
ALT SERPL W P-5'-P-CCNC: 38 U/L (ref 12–78)
BILIRUB SERPL-MCNC: 1 MG/DL (ref 0.2–1)
BUN SERPL-MCNC: 13 MG/DL (ref 7–18)
CALCIUM SERPL-MCNC: 9.3 MG/DL (ref 8.8–10.2)
CHLORIDE SERPL-SCNC: 106 MEQ/L (ref 98–107)
CHOLEST SERPL-MCNC: 154 MG/DL (ref ?–200)
CHOLEST/HDLC SERPL: 4.97 {RATIO} (ref ?–5)
CO2 SERPL-SCNC: 29 MEQ/L (ref 21–32)
CREAT SERPL-MCNC: 0.93 MG/DL (ref 0.7–1.3)
CREAT UR-MCNC: 46.4 MG/DL
EST. AVERAGE GLUCOSE BLD GHB EST-MCNC: 137 MG/DL (ref 60–110)
GFR SERPL CREATININE-BSD FRML MDRD: > 60 ML/MIN/{1.73_M2} (ref 49–?)
GLUCOSE SERPL-MCNC: 146 MG/DL (ref 70–100)
HCT VFR BLD AUTO: 44.4 % (ref 42–52)
HDLC SERPL-MCNC: 31 MG/DL (ref 40–?)
HGB BLD-MCNC: 15.2 G/DL (ref 13.5–17.5)
LDLC SERPL CALC-MCNC: 89 MG/DL (ref ?–100)
MCH RBC QN AUTO: 30 PG (ref 27–33)
MCHC RBC AUTO-ENTMCNC: 34.2 G/DL (ref 32–36.5)
MCV RBC AUTO: 87.7 FL (ref 80–96)
MICROALBUMIN UR-MCNC: 11.2 MG/L
MICROALBUMIN/CREAT UR: 24.1 MCG/MG (ref 0–30)
NONHDLC SERPL-MCNC: 123 MG/DL
PLATELET # BLD AUTO: 189 10^3/UL (ref 150–450)
POTASSIUM SERPL-SCNC: 4.1 MEQ/L (ref 3.5–5.1)
PROT SERPL-MCNC: 7.7 GM/DL (ref 6.4–8.2)
RBC # BLD AUTO: 5.06 10^6/UL (ref 4.3–6.1)
SODIUM SERPL-SCNC: 140 MEQ/L (ref 136–145)
TRIGL SERPL-MCNC: 169 MG/DL (ref ?–150)
WBC # BLD AUTO: 6.3 10^3/UL (ref 4–10)

## 2022-03-22 PROCEDURE — 80061 LIPID PANEL: CPT

## 2022-03-22 PROCEDURE — 83036 HEMOGLOBIN GLYCOSYLATED A1C: CPT

## 2022-03-22 PROCEDURE — 85027 COMPLETE CBC AUTOMATED: CPT

## 2022-03-22 PROCEDURE — 82043 UR ALBUMIN QUANTITATIVE: CPT

## 2022-03-22 PROCEDURE — 36415 COLL VENOUS BLD VENIPUNCTURE: CPT

## 2022-03-22 PROCEDURE — 80053 COMPREHEN METABOLIC PANEL: CPT

## 2022-04-12 ENCOUNTER — HOSPITAL ENCOUNTER (OUTPATIENT)
Dept: HOSPITAL 53 - M SMT PRO | Age: 67
End: 2022-04-12
Attending: UROLOGY
Payer: MEDICARE

## 2022-04-12 DIAGNOSIS — C61: Primary | ICD-10-CM

## 2022-04-12 DIAGNOSIS — N40.2: ICD-10-CM

## 2022-05-23 ENCOUNTER — HOSPITAL ENCOUNTER (OUTPATIENT)
Dept: HOSPITAL 53 - M PLALAB | Age: 67
End: 2022-05-23
Attending: UROLOGY
Payer: MEDICARE

## 2022-05-23 DIAGNOSIS — Z01.818: Primary | ICD-10-CM

## 2022-05-23 DIAGNOSIS — N39.0: ICD-10-CM

## 2022-05-23 DIAGNOSIS — Z79.01: ICD-10-CM

## 2022-05-23 DIAGNOSIS — C61: ICD-10-CM

## 2022-05-23 LAB
APTT BLD: 30.8 SECONDS (ref 25.9–37)
BUN SERPL-MCNC: 19 MG/DL (ref 7–18)
CALCIUM SERPL-MCNC: 8.9 MG/DL (ref 8.8–10.2)
CHLORIDE SERPL-SCNC: 103 MEQ/L (ref 98–107)
CO2 SERPL-SCNC: 30 MEQ/L (ref 21–32)
CREAT SERPL-MCNC: 1.06 MG/DL (ref 0.7–1.3)
GFR SERPL CREATININE-BSD FRML MDRD: > 60 ML/MIN/{1.73_M2} (ref 49–?)
GLUCOSE SERPL-MCNC: 144 MG/DL (ref 70–100)
HCT VFR BLD AUTO: 46 % (ref 42–52)
HGB BLD-MCNC: 15.6 G/DL (ref 13.5–17.5)
INR PPP: 1.02
MCH RBC QN AUTO: 30.1 PG (ref 27–33)
MCHC RBC AUTO-ENTMCNC: 33.9 G/DL (ref 32–36.5)
MCV RBC AUTO: 88.6 FL (ref 80–96)
PLATELET # BLD AUTO: 185 10^3/UL (ref 150–450)
POTASSIUM SERPL-SCNC: 3.9 MEQ/L (ref 3.5–5.1)
PROTHROMBIN TIME: 13.8 SECONDS (ref 12.7–14.5)
RBC # BLD AUTO: 5.19 10^6/UL (ref 4.3–6.1)
SODIUM SERPL-SCNC: 137 MEQ/L (ref 136–145)
WBC # BLD AUTO: 7.8 10^3/UL (ref 4–10)

## 2022-05-27 ENCOUNTER — HOSPITAL ENCOUNTER (OUTPATIENT)
Dept: HOSPITAL 53 - M LABSMTC | Age: 67
End: 2022-05-27
Attending: ANESTHESIOLOGY
Payer: MEDICARE

## 2022-05-27 DIAGNOSIS — Z01.812: Primary | ICD-10-CM

## 2022-05-27 DIAGNOSIS — Z20.822: ICD-10-CM

## 2022-05-31 ENCOUNTER — HOSPITAL ENCOUNTER (INPATIENT)
Dept: HOSPITAL 53 - M OR | Age: 67
LOS: 1 days | Discharge: HOME | DRG: 708 | End: 2022-06-01
Attending: UROLOGY | Admitting: UROLOGY
Payer: MEDICARE

## 2022-05-31 VITALS — SYSTOLIC BLOOD PRESSURE: 143 MMHG | DIASTOLIC BLOOD PRESSURE: 90 MMHG

## 2022-05-31 VITALS — DIASTOLIC BLOOD PRESSURE: 94 MMHG | SYSTOLIC BLOOD PRESSURE: 144 MMHG

## 2022-05-31 VITALS — SYSTOLIC BLOOD PRESSURE: 133 MMHG | DIASTOLIC BLOOD PRESSURE: 82 MMHG

## 2022-05-31 VITALS — HEIGHT: 68 IN | WEIGHT: 200.1 LBS | BODY MASS INDEX: 30.33 KG/M2

## 2022-05-31 VITALS — DIASTOLIC BLOOD PRESSURE: 83 MMHG | SYSTOLIC BLOOD PRESSURE: 130 MMHG

## 2022-05-31 VITALS — SYSTOLIC BLOOD PRESSURE: 132 MMHG | OXYGEN SATURATION: 94 % | DIASTOLIC BLOOD PRESSURE: 85 MMHG

## 2022-05-31 VITALS — SYSTOLIC BLOOD PRESSURE: 168 MMHG | DIASTOLIC BLOOD PRESSURE: 98 MMHG

## 2022-05-31 VITALS — SYSTOLIC BLOOD PRESSURE: 134 MMHG | DIASTOLIC BLOOD PRESSURE: 86 MMHG

## 2022-05-31 VITALS — SYSTOLIC BLOOD PRESSURE: 178 MMHG | DIASTOLIC BLOOD PRESSURE: 110 MMHG

## 2022-05-31 DIAGNOSIS — Z79.84: ICD-10-CM

## 2022-05-31 DIAGNOSIS — C61: Primary | ICD-10-CM

## 2022-05-31 DIAGNOSIS — I10: ICD-10-CM

## 2022-05-31 DIAGNOSIS — E11.9: ICD-10-CM

## 2022-05-31 DIAGNOSIS — Z79.899: ICD-10-CM

## 2022-05-31 LAB
BUN SERPL-MCNC: 22 MG/DL (ref 7–18)
CALCIUM SERPL-MCNC: 9 MG/DL (ref 8.8–10.2)
CHLORIDE SERPL-SCNC: 107 MEQ/L (ref 98–107)
CO2 SERPL-SCNC: 28 MEQ/L (ref 21–32)
CREAT SERPL-MCNC: 1.26 MG/DL (ref 0.7–1.3)
GFR SERPL CREATININE-BSD FRML MDRD: > 60 ML/MIN/{1.73_M2} (ref 49–?)
GLUCOSE SERPL-MCNC: 225 MG/DL (ref 70–100)
HCT VFR BLD AUTO: 43.3 % (ref 42–52)
HGB BLD-MCNC: 14.9 G/DL (ref 13.5–17.5)
MCH RBC QN AUTO: 30.7 PG (ref 27–33)
MCHC RBC AUTO-ENTMCNC: 34.4 G/DL (ref 32–36.5)
MCV RBC AUTO: 89.3 FL (ref 80–96)
PLATELET # BLD AUTO: 172 10^3/UL (ref 150–450)
POTASSIUM SERPL-SCNC: 4 MEQ/L (ref 3.5–5.1)
RBC # BLD AUTO: 4.85 10^6/UL (ref 4.3–6.1)
SODIUM SERPL-SCNC: 140 MEQ/L (ref 136–145)
WBC # BLD AUTO: 11.4 10^3/UL (ref 4–10)

## 2022-05-31 PROCEDURE — 0VT04ZZ RESECTION OF PROSTATE, PERCUTANEOUS ENDOSCOPIC APPROACH: ICD-10-PCS | Performed by: UROLOGY

## 2022-05-31 PROCEDURE — 8E0W4CZ ROBOTIC ASSISTED PROCEDURE OF TRUNK REGION, PERCUTANEOUS ENDOSCOPIC APPROACH: ICD-10-PCS | Performed by: UROLOGY

## 2022-05-31 PROCEDURE — 07BC4ZZ EXCISION OF PELVIS LYMPHATIC, PERCUTANEOUS ENDOSCOPIC APPROACH: ICD-10-PCS | Performed by: UROLOGY

## 2022-05-31 RX ADMIN — SODIUM CHLORIDE SCH UNITS: 4.5 INJECTION, SOLUTION INTRAVENOUS at 14:51

## 2022-05-31 RX ADMIN — INSULIN LISPRO SCH UNITS: 100 INJECTION, SOLUTION INTRAVENOUS; SUBCUTANEOUS at 13:20

## 2022-05-31 RX ADMIN — INSULIN LISPRO SCH UNITS: 100 INJECTION, SOLUTION INTRAVENOUS; SUBCUTANEOUS at 13:12

## 2022-05-31 RX ADMIN — DOCUSATE SODIUM SCH MG: 100 CAPSULE, LIQUID FILLED ORAL at 21:12

## 2022-05-31 RX ADMIN — CEFAZOLIN SODIUM SCH MLS/HR: 1 INJECTION, POWDER, FOR SOLUTION INTRAMUSCULAR; INTRAVENOUS at 23:51

## 2022-05-31 RX ADMIN — INSULIN LISPRO SCH UNITS: 100 INJECTION, SOLUTION INTRAVENOUS; SUBCUTANEOUS at 18:05

## 2022-05-31 RX ADMIN — SODIUM CHLORIDE SCH UNITS: 4.5 INJECTION, SOLUTION INTRAVENOUS at 21:13

## 2022-05-31 RX ADMIN — CEFAZOLIN SODIUM SCH MLS/HR: 1 INJECTION, POWDER, FOR SOLUTION INTRAMUSCULAR; INTRAVENOUS at 15:03

## 2022-05-31 RX ADMIN — SODIUM CHLORIDE SCH MLS/HR: 9 INJECTION, SOLUTION INTRAVENOUS at 13:18

## 2022-06-01 VITALS — DIASTOLIC BLOOD PRESSURE: 98 MMHG | SYSTOLIC BLOOD PRESSURE: 160 MMHG

## 2022-06-01 VITALS — SYSTOLIC BLOOD PRESSURE: 210 MMHG | DIASTOLIC BLOOD PRESSURE: 118 MMHG

## 2022-06-01 VITALS — SYSTOLIC BLOOD PRESSURE: 166 MMHG | DIASTOLIC BLOOD PRESSURE: 94 MMHG

## 2022-06-01 VITALS — OXYGEN SATURATION: 94 %

## 2022-06-01 VITALS — DIASTOLIC BLOOD PRESSURE: 88 MMHG | SYSTOLIC BLOOD PRESSURE: 137 MMHG

## 2022-06-01 VITALS — DIASTOLIC BLOOD PRESSURE: 107 MMHG | SYSTOLIC BLOOD PRESSURE: 160 MMHG

## 2022-06-01 VITALS — DIASTOLIC BLOOD PRESSURE: 90 MMHG | SYSTOLIC BLOOD PRESSURE: 140 MMHG

## 2022-06-01 VITALS — SYSTOLIC BLOOD PRESSURE: 160 MMHG | DIASTOLIC BLOOD PRESSURE: 98 MMHG

## 2022-06-01 VITALS — DIASTOLIC BLOOD PRESSURE: 112 MMHG | SYSTOLIC BLOOD PRESSURE: 178 MMHG

## 2022-06-01 VITALS — DIASTOLIC BLOOD PRESSURE: 112 MMHG | SYSTOLIC BLOOD PRESSURE: 184 MMHG

## 2022-06-01 VITALS — DIASTOLIC BLOOD PRESSURE: 86 MMHG | SYSTOLIC BLOOD PRESSURE: 134 MMHG

## 2022-06-01 LAB
BUN SERPL-MCNC: 17 MG/DL (ref 7–18)
CALCIUM SERPL-MCNC: 9 MG/DL (ref 8.8–10.2)
CHLORIDE SERPL-SCNC: 109 MEQ/L (ref 98–107)
CO2 SERPL-SCNC: 27 MEQ/L (ref 21–32)
CREAT SERPL-MCNC: 0.97 MG/DL (ref 0.7–1.3)
GFR SERPL CREATININE-BSD FRML MDRD: > 60 ML/MIN/{1.73_M2} (ref 49–?)
GLUCOSE SERPL-MCNC: 160 MG/DL (ref 70–100)
HCT VFR BLD AUTO: 41.3 % (ref 42–52)
HGB BLD-MCNC: 14 G/DL (ref 13.5–17.5)
MCH RBC QN AUTO: 30.5 PG (ref 27–33)
MCHC RBC AUTO-ENTMCNC: 33.9 G/DL (ref 32–36.5)
MCV RBC AUTO: 90 FL (ref 80–96)
PLATELET # BLD AUTO: 158 10^3/UL (ref 150–450)
POTASSIUM SERPL-SCNC: 3.9 MEQ/L (ref 3.5–5.1)
RBC # BLD AUTO: 4.59 10^6/UL (ref 4.3–6.1)
SODIUM SERPL-SCNC: 142 MEQ/L (ref 136–145)
WBC # BLD AUTO: 13.8 10^3/UL (ref 4–10)

## 2022-06-01 RX ADMIN — INSULIN LISPRO SCH UNITS: 100 INJECTION, SOLUTION INTRAVENOUS; SUBCUTANEOUS at 08:35

## 2022-06-01 RX ADMIN — SODIUM CHLORIDE SCH UNITS: 4.5 INJECTION, SOLUTION INTRAVENOUS at 05:32

## 2022-06-01 RX ADMIN — DOCUSATE SODIUM SCH MG: 100 CAPSULE, LIQUID FILLED ORAL at 08:34

## 2022-06-01 RX ADMIN — INSULIN LISPRO SCH UNITS: 100 INJECTION, SOLUTION INTRAVENOUS; SUBCUTANEOUS at 12:31

## 2022-06-01 RX ADMIN — INSULIN LISPRO SCH UNITS: 100 INJECTION, SOLUTION INTRAVENOUS; SUBCUTANEOUS at 17:20

## 2022-06-01 RX ADMIN — SODIUM CHLORIDE SCH UNITS: 4.5 INJECTION, SOLUTION INTRAVENOUS at 16:02

## 2022-06-01 RX ADMIN — SODIUM CHLORIDE SCH MLS/HR: 9 INJECTION, SOLUTION INTRAVENOUS at 03:25

## 2022-07-01 ENCOUNTER — HOSPITAL ENCOUNTER (OUTPATIENT)
Dept: HOSPITAL 53 - M PLALAB | Age: 67
End: 2022-07-01
Attending: NURSE PRACTITIONER
Payer: MEDICARE

## 2022-07-01 DIAGNOSIS — C61: Primary | ICD-10-CM

## 2022-09-22 ENCOUNTER — HOSPITAL ENCOUNTER (OUTPATIENT)
Dept: HOSPITAL 53 - M PLALAB | Age: 67
End: 2022-09-22
Attending: FAMILY MEDICINE
Payer: MEDICARE

## 2022-09-22 DIAGNOSIS — E78.2: Primary | ICD-10-CM

## 2022-09-22 DIAGNOSIS — E11.69: ICD-10-CM

## 2022-09-22 LAB
ALBUMIN SERPL BCG-MCNC: 3.8 GM/DL (ref 3.2–5.2)
ALT SERPL W P-5'-P-CCNC: 40 U/L (ref 12–78)
BILIRUB SERPL-MCNC: 1 MG/DL (ref 0.2–1)
BUN SERPL-MCNC: 17 MG/DL (ref 7–18)
CALCIUM SERPL-MCNC: 9.4 MG/DL (ref 8.8–10.2)
CHLORIDE SERPL-SCNC: 105 MEQ/L (ref 98–107)
CHOLEST SERPL-MCNC: 178 MG/DL (ref ?–200)
CHOLEST/HDLC SERPL: 5.39 {RATIO} (ref ?–5)
CO2 SERPL-SCNC: 28 MEQ/L (ref 21–32)
CREAT SERPL-MCNC: 1.01 MG/DL (ref 0.7–1.3)
EST. AVERAGE GLUCOSE BLD GHB EST-MCNC: 151 MG/DL (ref 60–110)
GFR SERPL CREATININE-BSD FRML MDRD: > 60 ML/MIN/{1.73_M2} (ref 49–?)
GLUCOSE SERPL-MCNC: 127 MG/DL (ref 70–100)
HDLC SERPL-MCNC: 33 MG/DL (ref 40–?)
LDLC SERPL CALC-MCNC: 111 MG/DL (ref ?–100)
NONHDLC SERPL-MCNC: 145 MG/DL
POTASSIUM SERPL-SCNC: 3.7 MEQ/L (ref 3.5–5.1)
PROT SERPL-MCNC: 7.7 GM/DL (ref 6.4–8.2)
SODIUM SERPL-SCNC: 139 MEQ/L (ref 136–145)
TRIGL SERPL-MCNC: 170 MG/DL (ref ?–150)

## 2022-10-07 ENCOUNTER — HOSPITAL ENCOUNTER (OUTPATIENT)
Dept: HOSPITAL 53 - M PLALAB | Age: 67
End: 2022-10-07
Attending: UROLOGY
Payer: MEDICARE

## 2022-10-07 DIAGNOSIS — C61: Primary | ICD-10-CM

## 2022-12-30 ENCOUNTER — HOSPITAL ENCOUNTER (OUTPATIENT)
Dept: HOSPITAL 53 - M PLALAB | Age: 67
End: 2022-12-30
Attending: FAMILY MEDICINE
Payer: MEDICARE

## 2022-12-30 DIAGNOSIS — E11.9: Primary | ICD-10-CM

## 2022-12-30 LAB
BUN SERPL-MCNC: 23 MG/DL (ref 9–23)
CALCIUM SERPL-MCNC: 9.4 MG/DL (ref 8.3–10.6)
CHLORIDE SERPL-SCNC: 98 MMOL/L (ref 98–107)
CO2 SERPL-SCNC: 28 MMOL/L (ref 20–31)
CREAT SERPL-MCNC: 0.96 MG/DL (ref 0.7–1.3)
EST. AVERAGE GLUCOSE BLD GHB EST-MCNC: 252 MG/DL (ref 60–110)
GFR SERPL CREATININE-BSD FRML MDRD: > 60 ML/MIN/{1.73_M2} (ref 49–?)
GLUCOSE SERPL-MCNC: 475 MG/DL (ref 74–106)
POTASSIUM SERPL-SCNC: 4.5 MMOL/L (ref 3.5–5.1)
SODIUM SERPL-SCNC: 134 MMOL/L (ref 136–145)

## 2023-01-24 ENCOUNTER — HOSPITAL ENCOUNTER (OUTPATIENT)
Dept: HOSPITAL 53 - M SMT | Age: 68
End: 2023-01-24
Attending: UROLOGY
Payer: MEDICARE

## 2023-01-24 DIAGNOSIS — N39.0: Primary | ICD-10-CM

## 2023-01-24 LAB
APPEARANCE UR: (no result)
BACTERIA URNS QL MICRO: (no result)
BILIRUB UR QL STRIP: NEGATIVE
COLOR UR: YELLOW
GLUCOSE UR STRIP-MCNC: NEGATIVE MG/DL
GRAN CASTS URNS QL MICRO: (no result) /LPF
HGB UR QL STRIP: POSITIVE
HYALINE CASTS URNS QL MICRO: (no result) /LPF (ref 0–1)
KETONES UR QL STRIP: NEGATIVE MG/DL
LEUKOCYTE ESTERASE UR QL STRIP: POSITIVE
MUCOUS THREADS URNS QL MICRO: (no result)
NITRITE UR QL STRIP: NEGATIVE
PH UR STRIP: 5 UNITS (ref 5–7)
PROT UR STRIP-MCNC: (no result) MG/DL
RBC #/AREA URNS HPF: (no result) /HPF (ref 0–3)
SP GR UR STRIP: 1.01 (ref 1–1.03)
SQUAMOUS URNS QL MICRO: (no result) /HPF
UROBILINOGEN UR QL STRIP: (no result) MG/DL
WBC #/AREA URNS HPF: (no result) /HPF (ref 0–3)

## 2023-01-30 ENCOUNTER — HOSPITAL ENCOUNTER (OUTPATIENT)
Dept: HOSPITAL 53 - M SFHCADAM | Age: 68
End: 2023-01-30
Attending: UROLOGY
Payer: MEDICARE

## 2023-01-30 DIAGNOSIS — C61: Primary | ICD-10-CM

## 2023-01-31 ENCOUNTER — HOSPITAL ENCOUNTER (OUTPATIENT)
Dept: HOSPITAL 53 - M ADAMS | Age: 68
End: 2023-01-31
Attending: PHYSICIAN ASSISTANT
Payer: MEDICARE

## 2023-01-31 ENCOUNTER — HOSPITAL ENCOUNTER (OUTPATIENT)
Dept: HOSPITAL 53 - M SFHCADAM | Age: 68
End: 2023-01-31
Attending: PHYSICIAN ASSISTANT
Payer: MEDICARE

## 2023-01-31 DIAGNOSIS — E11.65: Primary | ICD-10-CM

## 2023-01-31 DIAGNOSIS — J20.9: Primary | ICD-10-CM

## 2023-01-31 DIAGNOSIS — N39.0: ICD-10-CM

## 2023-01-31 DIAGNOSIS — J20.9: ICD-10-CM

## 2023-01-31 LAB
BASOPHILS # BLD AUTO: 0.1 10^3/UL (ref 0–0.2)
BASOPHILS NFR BLD AUTO: 0.7 % (ref 0–1)
BUN SERPL-MCNC: 24 MG/DL (ref 9–23)
CALCIUM SERPL-MCNC: 9.6 MG/DL (ref 8.3–10.6)
CHLORIDE SERPL-SCNC: 102 MMOL/L (ref 98–107)
CO2 SERPL-SCNC: 25 MMOL/L (ref 20–31)
CREAT SERPL-MCNC: 1.15 MG/DL (ref 0.7–1.3)
EOSINOPHIL # BLD AUTO: 0.3 10^3/UL (ref 0–0.5)
EOSINOPHIL NFR BLD AUTO: 2 % (ref 0–3)
GFR SERPL CREATININE-BSD FRML MDRD: > 60 ML/MIN/{1.73_M2} (ref 49–?)
GLUCOSE SERPL-MCNC: 165 MG/DL (ref 74–106)
HCT VFR BLD AUTO: 43.1 % (ref 42–52)
HGB BLD-MCNC: 13.8 G/DL (ref 13.5–17.5)
LYMPHOCYTES # BLD AUTO: 2.9 10^3/UL (ref 1.5–5)
LYMPHOCYTES NFR BLD AUTO: 19.7 % (ref 24–44)
MCH RBC QN AUTO: 29.4 PG (ref 27–33)
MCHC RBC AUTO-ENTMCNC: 32 G/DL (ref 32–36.5)
MCV RBC AUTO: 91.7 FL (ref 80–96)
MONOCYTES # BLD AUTO: 1.1 10^3/UL (ref 0–0.8)
MONOCYTES NFR BLD AUTO: 7.1 % (ref 2–8)
NEUTROPHILS # BLD AUTO: 10.3 10^3/UL (ref 1.5–8.5)
NEUTROPHILS NFR BLD AUTO: 68.8 % (ref 36–66)
PLATELET # BLD AUTO: 399 10^3/UL (ref 150–450)
POTASSIUM SERPL-SCNC: 5.5 MMOL/L (ref 3.5–5.1)
RBC # BLD AUTO: 4.7 10^6/UL (ref 4.3–6.1)
SODIUM SERPL-SCNC: 135 MMOL/L (ref 136–145)
WBC # BLD AUTO: 14.9 10^3/UL (ref 4–10)

## 2023-02-07 ENCOUNTER — HOSPITAL ENCOUNTER (OUTPATIENT)
Dept: HOSPITAL 53 - M PLALAB | Age: 68
End: 2023-02-07
Attending: PHYSICIAN ASSISTANT
Payer: MEDICARE

## 2023-02-07 DIAGNOSIS — E87.5: Primary | ICD-10-CM

## 2023-02-07 LAB
BUN SERPL-MCNC: 30 MG/DL (ref 9–23)
CALCIUM SERPL-MCNC: 9.7 MG/DL (ref 8.3–10.6)
CHLORIDE SERPL-SCNC: 99 MMOL/L (ref 98–107)
CO2 SERPL-SCNC: 27 MMOL/L (ref 20–31)
CREAT SERPL-MCNC: 1.11 MG/DL (ref 0.7–1.3)
GFR SERPL CREATININE-BSD FRML MDRD: > 60 ML/MIN/{1.73_M2} (ref 49–?)
GLUCOSE SERPL-MCNC: 184 MG/DL (ref 74–106)
POTASSIUM SERPL-SCNC: 4.5 MMOL/L (ref 3.5–5.1)
SODIUM SERPL-SCNC: 135 MMOL/L (ref 136–145)

## 2023-03-13 ENCOUNTER — HOSPITAL ENCOUNTER (OUTPATIENT)
Dept: HOSPITAL 53 - M PLALAB | Age: 68
End: 2023-03-13
Attending: FAMILY MEDICINE
Payer: MEDICARE

## 2023-03-13 DIAGNOSIS — E11.69: Primary | ICD-10-CM

## 2023-03-13 DIAGNOSIS — R97.20: ICD-10-CM

## 2023-03-13 LAB
BUN SERPL-MCNC: 19 MG/DL (ref 9–23)
CALCIUM SERPL-MCNC: 9.2 MG/DL (ref 8.3–10.6)
CHLORIDE SERPL-SCNC: 104 MMOL/L (ref 98–107)
CO2 SERPL-SCNC: 28 MMOL/L (ref 20–31)
CREAT SERPL-MCNC: 0.89 MG/DL (ref 0.7–1.3)
EST. AVERAGE GLUCOSE BLD GHB EST-MCNC: 151 MG/DL (ref 60–110)
GFR SERPL CREATININE-BSD FRML MDRD: > 60 ML/MIN/{1.73_M2} (ref 49–?)
GLUCOSE SERPL-MCNC: 104 MG/DL (ref 74–106)
POTASSIUM SERPL-SCNC: 4 MMOL/L (ref 3.5–5.1)
PSA SERPL-MCNC: 0.04 NG/ML (ref ?–4)
SODIUM SERPL-SCNC: 140 MMOL/L (ref 136–145)

## 2023-06-13 ENCOUNTER — HOSPITAL ENCOUNTER (OUTPATIENT)
Dept: HOSPITAL 53 - M PLALAB | Age: 68
End: 2023-06-13
Attending: FAMILY MEDICINE
Payer: MEDICARE

## 2023-06-13 DIAGNOSIS — E11.9: Primary | ICD-10-CM

## 2023-06-13 LAB
BUN SERPL-MCNC: 20 MG/DL (ref 9–23)
CALCIUM SERPL-MCNC: 9.6 MG/DL (ref 8.3–10.6)
CHLORIDE SERPL-SCNC: 105 MMOL/L (ref 98–107)
CO2 SERPL-SCNC: 28 MMOL/L (ref 20–31)
CREAT SERPL-MCNC: 0.95 MG/DL (ref 0.7–1.3)
EST. AVERAGE GLUCOSE BLD GHB EST-MCNC: 111 MG/DL (ref 60–110)
GFR SERPL CREATININE-BSD FRML MDRD: > 60 ML/MIN/{1.73_M2} (ref 49–?)
GLUCOSE SERPL-MCNC: 109 MG/DL (ref 74–106)
POTASSIUM SERPL-SCNC: 4.4 MMOL/L (ref 3.5–5.1)
SODIUM SERPL-SCNC: 141 MMOL/L (ref 136–145)

## 2023-06-16 ENCOUNTER — HOSPITAL ENCOUNTER (EMERGENCY)
Dept: HOSPITAL 53 - M ED | Age: 68
Discharge: ANOTHER HOSPITAL | End: 2023-06-16
Payer: MEDICARE

## 2023-06-16 VITALS — BODY MASS INDEX: 26.26 KG/M2 | WEIGHT: 183.42 LBS | HEIGHT: 70 IN

## 2023-06-16 VITALS — DIASTOLIC BLOOD PRESSURE: 64 MMHG | SYSTOLIC BLOOD PRESSURE: 124 MMHG

## 2023-06-16 VITALS — TEMPERATURE: 98.5 F | DIASTOLIC BLOOD PRESSURE: 93 MMHG | OXYGEN SATURATION: 99 % | SYSTOLIC BLOOD PRESSURE: 132 MMHG

## 2023-06-16 DIAGNOSIS — F41.9: ICD-10-CM

## 2023-06-16 DIAGNOSIS — J96.90: Primary | ICD-10-CM

## 2023-06-16 DIAGNOSIS — Z91.018: ICD-10-CM

## 2023-06-16 DIAGNOSIS — F32.A: ICD-10-CM

## 2023-06-16 DIAGNOSIS — Z79.84: ICD-10-CM

## 2023-06-16 DIAGNOSIS — G47.33: ICD-10-CM

## 2023-06-16 DIAGNOSIS — I10: ICD-10-CM

## 2023-06-16 DIAGNOSIS — Z85.46: ICD-10-CM

## 2023-06-16 DIAGNOSIS — K21.9: ICD-10-CM

## 2023-06-16 DIAGNOSIS — Z79.899: ICD-10-CM

## 2023-06-16 DIAGNOSIS — Z91.012: ICD-10-CM

## 2023-06-16 DIAGNOSIS — I62.00: ICD-10-CM

## 2023-06-16 LAB
ALBUMIN SERPL BCG-MCNC: 4 G/DL (ref 3.2–5.2)
ALP SERPL-CCNC: 80 U/L (ref 46–116)
ALT SERPL W P-5'-P-CCNC: 41 U/L (ref 7–40)
AST SERPL-CCNC: 34 U/L (ref ?–34)
BASE EXCESS BLDA CALC-SCNC: -2.7 MMOL/L (ref -2–2)
BASOPHILS # BLD AUTO: 0.1 10^3/UL (ref 0–0.2)
BASOPHILS NFR BLD AUTO: 0.5 % (ref 0–1)
BILIRUB CONJ SERPL-MCNC: 0.4 MG/DL (ref ?–0.4)
BILIRUB SERPL-MCNC: 1.1 MG/DL (ref 0.3–1.2)
BUN SERPL-MCNC: 19 MG/DL (ref 9–23)
CALCIUM SERPL-MCNC: 8.6 MG/DL (ref 8.3–10.6)
CHLORIDE SERPL-SCNC: 102 MMOL/L (ref 98–107)
CO2 BLDA CALC-SCNC: 23.5 MMOL/L (ref 23–31)
CO2 SERPL-SCNC: 26 MMOL/L (ref 20–31)
CREAT SERPL-MCNC: 1.01 MG/DL (ref 0.7–1.3)
EOSINOPHIL # BLD AUTO: 0.3 10^3/UL (ref 0–0.5)
EOSINOPHIL NFR BLD AUTO: 1.5 % (ref 0–3)
GFR SERPL CREATININE-BSD FRML MDRD: > 60 ML/MIN/{1.73_M2} (ref 49–?)
GLUCOSE SERPL-MCNC: 233 MG/DL (ref 74–106)
HCO3 BLDA-SCNC: 22.3 MMOL/L (ref 22–26)
HCO3 STD BLDA-SCNC: 22.2 MMOL/L. (ref 22–26)
HCT VFR BLD AUTO: 43.5 % (ref 42–52)
HGB BLD-MCNC: 14.7 G/DL (ref 13.5–17.5)
LYMPHOCYTES # BLD AUTO: 3.6 10^3/UL (ref 1.5–5)
LYMPHOCYTES NFR BLD AUTO: 20.7 % (ref 24–44)
MCH RBC QN AUTO: 29.6 PG (ref 27–33)
MCHC RBC AUTO-ENTMCNC: 33.8 G/DL (ref 32–36.5)
MCV RBC AUTO: 87.5 FL (ref 80–96)
MONOCYTES # BLD AUTO: 1.2 10^3/UL (ref 0–0.8)
MONOCYTES NFR BLD AUTO: 6.7 % (ref 2–8)
NEUTROPHILS # BLD AUTO: 12.2 10^3/UL (ref 1.5–8.5)
NEUTROPHILS NFR BLD AUTO: 70.1 % (ref 36–66)
PCO2 BLDA: 39.3 MMHG (ref 35–45)
PH BLDA: 7.37 UNITS (ref 7.35–7.45)
PLATELET # BLD AUTO: 164 10^3/UL (ref 150–450)
PO2 BLDA: 89.5 MMHG (ref 75–100)
POTASSIUM SERPL-SCNC: 3.7 MMOL/L (ref 3.5–5.1)
PROT SERPL-MCNC: 7.3 G/DL (ref 5.7–8.2)
RBC # BLD AUTO: 4.97 10^6/UL (ref 4.3–6.1)
SAO2 % BLDA: 96.5 % (ref 95–99)
SODIUM SERPL-SCNC: 138 MMOL/L (ref 136–145)
TSH SERPL DL<=0.005 MIU/L-ACNC: 1.72 UIU/ML (ref 0.55–4.78)
WBC # BLD AUTO: 17.4 10^3/UL (ref 4–10)

## 2023-06-16 PROCEDURE — 93041 RHYTHM ECG TRACING: CPT

## 2023-06-16 PROCEDURE — 94760 N-INVAS EAR/PLS OXIMETRY 1: CPT

## 2023-06-16 PROCEDURE — 96375 TX/PRO/DX INJ NEW DRUG ADDON: CPT

## 2023-06-16 PROCEDURE — 84443 ASSAY THYROID STIM HORMONE: CPT

## 2023-06-16 PROCEDURE — 51702 INSERT TEMP BLADDER CATH: CPT

## 2023-06-16 PROCEDURE — 31500 INSERT EMERGENCY AIRWAY: CPT

## 2023-06-16 PROCEDURE — 80076 HEPATIC FUNCTION PANEL: CPT

## 2023-06-16 PROCEDURE — 96367 TX/PROPH/DG ADDL SEQ IV INF: CPT

## 2023-06-16 PROCEDURE — 83605 ASSAY OF LACTIC ACID: CPT

## 2023-06-16 PROCEDURE — 36600 WITHDRAWAL OF ARTERIAL BLOOD: CPT

## 2023-06-16 PROCEDURE — 71250 CT THORAX DX C-: CPT

## 2023-06-16 PROCEDURE — 70490 CT SOFT TISSUE NECK W/O DYE: CPT

## 2023-06-16 PROCEDURE — 93005 ELECTROCARDIOGRAM TRACING: CPT

## 2023-06-16 PROCEDURE — 85025 COMPLETE CBC W/AUTO DIFF WBC: CPT

## 2023-06-16 PROCEDURE — 70450 CT HEAD/BRAIN W/O DYE: CPT

## 2023-06-16 PROCEDURE — 87077 CULTURE AEROBIC IDENTIFY: CPT

## 2023-06-16 PROCEDURE — 82803 BLOOD GASES ANY COMBINATION: CPT

## 2023-06-16 PROCEDURE — 96365 THER/PROPH/DIAG IV INF INIT: CPT

## 2023-06-16 PROCEDURE — 71045 X-RAY EXAM CHEST 1 VIEW: CPT

## 2023-06-16 PROCEDURE — 80048 BASIC METABOLIC PNL TOTAL CA: CPT

## 2023-06-16 PROCEDURE — 87040 BLOOD CULTURE FOR BACTERIA: CPT

## 2023-06-16 PROCEDURE — 99285 EMERGENCY DEPT VISIT HI MDM: CPT

## 2023-06-16 PROCEDURE — 87635 SARS-COV-2 COVID-19 AMP PRB: CPT

## 2023-06-16 PROCEDURE — 96366 THER/PROPH/DIAG IV INF ADDON: CPT

## 2023-06-16 RX ADMIN — LABETALOL HYDROCHLORIDE SCH MLS/HR: 5 INJECTION INTRAVENOUS at 05:46

## 2023-06-16 RX ADMIN — LABETALOL HYDROCHLORIDE SCH MLS/HR: 5 INJECTION INTRAVENOUS at 05:10
